# Patient Record
Sex: MALE | Race: BLACK OR AFRICAN AMERICAN
[De-identification: names, ages, dates, MRNs, and addresses within clinical notes are randomized per-mention and may not be internally consistent; named-entity substitution may affect disease eponyms.]

---

## 2021-07-11 NOTE — EKG
[address] [employer]  [address]
Phone:  [phone]                    ELECTROCARDIOGRAM REPORT      
_______________________________________________________________________________
 
Name:       GLORIA ESPINOZA           [facility]                     [id number]     Account #:      [id number]  
Admission:  21    Attend Phys:                          
Discharge:              Date of Birth:  [birth year]  
                                                          Report #: [id number]
   [id number]
_______________________________________________________________________________
                         [facility] ED
                                       
Test Date:    2021               Test Time:    08:20:24
Pat Name:     GLORIA ESPINOZA          Department:   
Patient ID:   [id number]            Room:          
Gender:                               Technician:   JEANNIE MIRAMONTES
:          1980               Requested By: Tavo Hager
Order Number: [id number] MD:   Ramana Lofton
                                 Measurements
Intervals                              Axis          
Rate:         89                       P:            62
AR:           176                      QRS:          19
QRSD:         127                      T:            41
QT:           361                                    
QTc:          440                                    
                           Interpretive Statements
Sinus rhythm
RBBB
ST elev, probable normal early repol pattern
No previous ECG available for comparison
Electronically Signed On 2021 10:57:25 CDT by Ramana Lofton
[email or web address]
 
 
 
 
 
 
 
 
 
 
 
 
 
 
 
 
 
 
 
 
 
  <ELECTRONICALLY SIGNED>
   By: Ramana Lofton MD               
  21     1057
D: 21 0820                           _____________________________________
T: 21 0820                           Ramana Lofton MD                 /EPI

## 2021-08-28 NOTE — NUR
PT ADMITTED TO THE UNIT AT 2200. PT IS A/O X4 AND IS UP AD JOAO. HAS BEEN
EDUCATED ON USE OF CALL LIGHT AND BED CONTROLS. IS ROOM AIR. VSS. AFEBRILE.
MEDICATIONS GIVEN PER MAR. WILL REMAIN ON CLEAR LIQ DIET. CALL LIGHT IS WITHIN
REACH. WILL CONTINUE TO MONITOR.

## 2021-08-28 NOTE — EKG
[place] [address] [facility]
[place]  [address]
Phone:  [phone]                    ELECTROCARDIOGRAM REPORT      
_______________________________________________________________________________
 
Name:       GLORIA ESPINOZA HOSSEIN           [id number]     Account #:      [id number]  
Admission:  21    Attend Phys:                          
Discharge:  21    Date of Birth:  [birth year]  
                                                          Report #: [id number]
   [id number]
_______________________________________________________________________________
                         [facility] ED
                                       
Test Date:    2021               Test Time:    13:45:37
Pat Name:     GLORIA ESPINOZA          Department:   
Patient ID:   [id number]            Room:          
Gender:                               Technician:   
:          1980               Requested By: Mayte Ayala
Order Number: [id number] MD:   Alex Milan
                                 Measurements
Intervals                              Axis          
Rate:         112                      P:            66
AL:           171                      QRS:          29
QRSD:         115                      T:            59
QT:           324                                    
QTc:          443                                    
                           Interpretive Statements
Sinus tachycardia
Right bundle branch block
Compared to ECG 2021 08:20:24
Heart rate has increased
Electronically Signed On 2021 9:35:17 CDT by Alex Milan
[email or web address]
 
 
 
 
 
 
 
 
 
 
 
 
 
 
 
 
 
 
 
 
 
  <ELECTRONICALLY SIGNED>
   By: Alex Milan MD, [facility]   
  21     0935
D: 21 1345                           _____________________________________
T: 21 1345                           Alex Milan MD, FACC     /EPI

## 2021-08-28 NOTE — NUR
pt states he hasnt been able to urinate much. was able to eventually give the
er staff some yesterday but may take awhile.

## 2021-08-29 NOTE — NUR
ASSUMED CARE OF PT AT 0700 THIS MORNING. PT IS A/OX4, C/O LOWER ABD PAIN THAT
IS CONSTANT & SHARP. PT HAS NO OTHER COMPLAINTS AT THIS TIME. ASSESSMENTS AS
CHARTED AND OTHERWISE UNREMARKABLE. IV IN RT AC WITH NS AT 75ML/HR. PT HAD BM
THIS MORNING LOOSE IN CONSISTENCY. PT IS UP AT JOAO, CALL LIGHT AND OTHER
NEEDS ARE WITHIN REACH. MEDS AND TX GIVEN AS NEEDED AND SCHEDULED. WILL
MONITOR AND NOTE ANY CHANGES.

## 2021-08-29 NOTE — NUR
ASSESSMENT COMPLETED. PT IS ALERT AND ORIENTED. CONTINUES TO COMPLAIN OF
ABDOMINAL PAIN-FENTANYL GIVEN AS WELL AS ZOFRAN. PT LAYING IN BED-DOES NOT
APPEAR TO BE IN ANY DISTRESS. REPORTS OVER 5 BMS EARLER IN THE DAY, NONE SO
FAR SINCE START OF SHIFT. AFEBRILE. AWAITING CDIFF RESULTS. IVF INFUSING AS
WELL AS SCHEDULED ABTS. REQUESTED SLEEP AIDE. CALLS WITH NEEDS.

## 2021-08-30 NOTE — NUR
ASUMED PT CARE UPON TRANSFER TO UNIT AT 1530. PATIENT IS A&OX4, ABLE TO MAKE
NEEDS KNOWN. PATIENT REPORTS ABDOMINAL PAIN THAT DECREASES WITH PAIN MEDS
GIVEN PER EMAR. SIGNIFICANT OTHER AT BEDSIDE.

## 2021-08-30 NOTE — NUR
ASSESSMENT: CM REVIEWED CHART AND SPOKE WITH PATIENT AT THE BEDSIDE. PT IS
ALERT AND ORIENTED X4. PT WAS ADMITTED DUE TO COLITIS. PT REPORTS THAT HE
LIVES IN A HOUSE WITH HIS FAMILY. PT REPORTS BEING FULLY INDEPENDENT WITH ADLS
AND AMBULATION. PT DENIES ANY HX OF HH OR SNF. PT IS CURRENTLY ON IV ANBX AND
GI IS FOLLOWING. PT IS HAVING STOOL STUDIES. PT REPORTS HE DOES NOT HAVE A
PCP. CM ENCOURAGED PATIENT TO ESTABILISH A PCP AND TO CONTACT THE NUMBER ON
THE BACK OF HIS INSURNACE CARD TO FIND A PROVIDER IN NETWORK. CM ALSO PROVIDED
PATIENT WITH A LIST OF PCP PHYSICIANS HERE AT [facility] ([facility]) TO
VERIFY IF THEY ARE IN NETWORK. CM WILL CONTINUE TO FOLLOW TO ASSIST AS NEEDED.

## 2021-08-30 NOTE — NUR
ASSUMED CARE OF PT AGAIN THIS MORNING. PT'S ASSESSMENTS AND REPORT ARE
UNCHANGED FROM YESTERDAY AND OTHERWISE UNREMARKABLE. PT IN INDEP AMBULATION.
IV IN RT FA WITH NS AT 75ML/HR. CALL LIGHT AND OTHER NEEDS ARE WITHIN REACH.
MEDS AND TX GIVEN AS NEEDED AND SCHEDULED. PT IS BEING XFERRED TO [facility] TO
MAKE ROOM FOR OR PATIENTS. REPORT GIVEN TO JEANNIE RICHARD.

## 2021-08-31 NOTE — NUR
ASSUMED PT CARE THIS AM. PT A&OX4, ABLE TO MAKE NEEDS KNOWN. PATIENT REPORTING
PAIN OF 6/10, RESPONDS WELL TO PAIN MEDICATION GIVEN PER EMAR. PATIENT REPORTS
HAVING CLEAR STOOLS OVERNIGHT. PATIENT NPO FOR COLONOSCOPY. IV REMAINS PATENT,
FLUIDS INFUSING WITHOUT ISSUE. PATIENT UP AROUND ROOM INDEPENDENTLY. PATIENT
REMAINS ON ROOM AIR. CALL LIGHT WITHIN REACH.

## 2021-08-31 NOTE — NUR
TODAY THIS PT HAS HAD SOME STATED PAIN IN HIS ABD IN WHICH IT WAS TAKEN CARE
OF WITH MEDICATIONS. HE COMPLAINED OF NOT BEING ABLE TO SMOKE SO PHYSICIAN WAS
CONTACTED AND ORDERS WERE GIVEN HE NOW HAS A NICOTINE PATCH ON HIS L ARM. HE
HAS BEEN NPO SINCE MIDNIGHT FOR HIS PROCEDURE LATER TODAY.

## 2021-08-31 NOTE — NUR
PT HAD COLONOSCOPY THIS DAY. ANTICIPATE THAT PT WILL BE ABLE TO DC HOME WITH
NO NEEDS ONCE MEDICALLY STABLE. CM FOLLOWING REGARDING DC PLANNING.

## 2021-09-01 NOTE — NUR
PATIENT AOX4 MAKES NEEDS KNOWN. PAIN CONTROLLED THIS SHIFT. PATIENT IS UP AT
JOAO. PATIENT IN BED ASLEEP AT THIS TIME BREATHING REGULAR AND UNLABOURED.

## 2021-09-01 NOTE — P
[facility]
[address]
[place]   [address]                     PROCEDURE REPORT              
_______________________________________________________________________________
 
Name:       GLORIA ESPINOZA JR          Room #:         464-P       ADM IN  
M.R.#:      [id number]                       Account #:      [id number]  
Admission:  08/28/21    Attend Phys:    Jimy Fry MD     
Discharge:              Date of Birth:  [birth year]  
                                                          Report #: [id number]
                                                                    [id number] 
_______________________________________________________________________________
THIS REPORT FOR:  
 
cc:  FAM - No family physician/PCP 
     FAM - No family physician/PCP                                        
     Alexis Bishop MD                                   ~
 
 
cc:     Jimy Fry MD
DATE OF SERVICE: 08/31/2021
 
PROCEDURE PERFORMED:  Colonoscopy with biopsies.
 
HISTORY OF PRESENT ILLNESS:  The patient was admitted on 08/28/2021 with 
abdominal pain, nausea, vomiting, diarrhea.  Symptoms began last Thursday.  He 
denied any melena or hematochezia.  A CT scan of the abdomen and pelvis 
concerning for pancolitis.  He was started on antibiotics.  He had continued 
symptoms.  Stool studies have all been negative including cryptosporidium, 
campylobacter, C. diff, Giardia, Salmonella, Shigella, all are negative.  The 
patient has been on ciprofloxacin, metronidazole since admission.  Plan is for 
colonoscopy.
 
DESCRIPTION OF PROCEDURE:  The risks and benefits of the procedure were 
explained to the patient, those risks including but not limited to bleeding, 
perforation and the risk of sedation.  He understood these risks and gave me 
informed consent.  Sedation was given using propofol per anesthesia.  Next, a 
digital rectal exam was initially performed, which was normal.  Next, using a 
standard Olympus colonoscope, the scope was placed in the patient's anus and 
advanced under direct vision to the cecum.  The overall prep was excellent.  
Moderate colitis was noted throughout most of the colon involving the cecum, 
ascending, transverse, descending and sigmoid colon.  Mild-to-no significant 
changes were noted in the rectum.  The terminal ileum was intubated and normal 
in appearance.  Multiple biopsies were obtained in the ascending and transverse 
colon. Rectal biopsies were also obtained.  There was no evidence of bleeding.  
On retroflexion, no abnormalities were noted.  The scope was then withdrawn and 
the procedure terminated.  The patient tolerated the procedure well.
 
IMPRESSION:
1.  Diffuse colitis.
2.  Terminal ileum was normal.
 
RECOMMENDATIONS:
1.  Await biopsy results.
2.  We will advance diet at this time. Start Levsin on a p.r.n. basis.  Continue
antibiotics until biopsy results have been returned.
 
 
 
[address]   [address]                     PROCEDURE REPORT              
_______________________________________________________________________________
 
Name:       GLORIA ESPINOZA           Room #:         464-P       [facility] [place]  
.R.#:      [id number]                       Account #:      [id number]  
Admission:  08/28/21    Attend Phys:    Jimy Fry MD     
Discharge:              Date of Birth:  [birth year]  
                                                          Report #: [id number]
                                                                    [id number] 
_______________________________________________________________________________
 
 
Thank you for allowing me to participate in his care.
 
 
 
 
 
 
 
 
 
 
 
 
 
 
 
 
 
 
 
 
 
 
 
 
 
 
 
 
 
 
 
 
 
 
 
 
 
 
 
 
 
  <ELECTRONICALLY SIGNED>
   By: Alexis Bishop MD   
  09/01/21     1436
D: 08/31/21 1258                           _____________________________________
T: 08/31/21 2357                           Alexis Bishop MD     /nt

## 2021-09-01 NOTE — NUR
patient left ams at around 1933 accompanied by wife. ama paper sighed.
Hiwot np and supervisor notified. patient aox4 makes needs known.patient
is up at alanis.

## 2021-09-01 NOTE — NUR
Alert and orientated X4.  Calm, cooperative and compliant.  States he is
having abdominal pain 9/10.  Fentanyl 50mcg given IV over 5 min with good
immediate relief.  States he could still feel cramps but they were painless.
Talking on phone most of AM.  At times irritable wanting coffee.  Breath
sounds clear.  Reg HR auscultated.  Color pink with brisk capillary refill and
palpable peripheral pulses.  No edema noted.  Independent with voiding.
Active bowel sounds over soft, rounded abdomen.  Currently in room, no s/o
distress.

## 2021-09-02 NOTE — PATH
[facility]
[address]
[place]   [address]                     PATHOLOGY RPT PROCEDURE       
_______________________________________________________________________________
 
Name:       BARRY ESPINOZA           Room #:         464-P       [facility] IN  
M.R.#:      [id number]     Account #:      [id number]  
Admission:  08/28/21    Date of Birth:  [birth year]  
Discharge:  09/01/21                                    Report #:    [id number]
                                                        Path Case #: [id number]
_______________________________________________________________________________
 
[facility] Accession Number: [id number]
.                                                                01
Material submitted:                                        .
PART A: colon - ASCENDING COLON COLITIS BIOPSY. Modifiers: ascending
PART B: colon - TRANSVERSE COLON COLITIS BIOPSY. Modifiers: transverse
PART C: rectum - RECTUM BIOPSY
.                                                                01
Clinical history:                                          .
COLONOSCOPY
COLITIS
.                                                                02
**********************************************************************
Diagnosis:
A. Large intestine, ascending colon colitis, endoscopic biopsy:
- Mild to moderate active colitis with cryptitis and crypt abscess
formation.
- Negative for dysplasia or malignancy.
.
B. Large intestine, transverse colon colitis, endoscopic biopsy:
- Mild to moderate active colitis with cryptitis, crypt abscess formation
as well as focal surface ulceration.
- Negative for dysplasia or malignancy.
.
C. Large intestine, rectum, endoscopic biopsy:
- Mild active colitis with cryptitis.
- Negative for dysplasia or malignancy.
[facility]  09/02/2021  [facility]3 Local
**********************************************************************
.                                                                02
Comment:
Examination shows mild to moderate active colitis within the "ascending
colon" and the "transverse colon" biopsy tissues.  Cryptitis, crypt
abscess formation and markedly expanded lamina propria is identified
within these biopsy tissues.  In addition, the transverse colon biopsy
tissue shows surface ulceration as well.  There are no granulomata, or
parasitic organisms present.  There are no fibrin thrombi within the
lamina propria vessels or ischemic changes identified.  The rectum biopsy
tissue shows cryptitis, and a lesser degree of inflammation.  Overall,
findings may be suggestive of early inflammatory bowel disease.
Architecturally abnormal crypts are not appreciated.  There is no basal
plasmacytosis as well.  The differential diagnosis includes Crohn's
disease as well as ulcerative colitis.  Lack of granulomata does not
entirely exclude the possibility of Crohn's disease. Additionally, active
episode of colitis due to infectious etiology, or drug/medication induced
colitis is in the differential as well.  Please correlate clinically.
([facility]; 9/02/2021)
.                                                                02
 
 
[address]   [address]                     PATHOLOGY RPT PROCEDURE       
_______________________________________________________________________________
 
Name:       BARRY ESPINOZA           Room #:         464-P       [facility]  
M.R.#:      [id number]     Account #:      [id number]  
Admission:  08/28/21    Date of Birth:  [birth year]  
Discharge:  09/01/21                                    Report #:    [id number]
                                                        Path Case #: [id number]
_______________________________________________________________________________
Electronically signed:                                     .
Teresa Chandler MD, Pathologist
NPI- [id number]
.                                                                01
Gross description:                                         .
A.  The specimen is submitted in formalin, labeled "Seda Jr., Barry,
ascending colon biopsy colitis". Received are 3 segments of pale tan
tissue ranging in size from 0.3 to 0.4 cm in maximum dimensions. The
specimen is submitted in cassette A1.
.
B.  The specimen is submitted in formalin, labeled "Seda Jr., Barry,
transverse colon colitis biopsy". Received are 2 segments of pale tan
tissue ranging in size from 0.4 to 0.5 cm in maximum dimensions. The
specimen is submitted in cassette B1.
.
C.  The specimen is submitted in formalin, labeled "Seda Jr., Barry,
rectum biopsy". Received are 5 segments of pale tan tissue ranging in size
from 0.2 to 0.6 cm in maximum dimensions. The specimen is submitted in
cassette C1.
([facility]; 9/1/2021)
[facility]  09/01/2021  1135 Local
.                                                                02
Pathologist provided ICD-10:
K52.9, K63.0, K62.89, K63.3
.                                                                02
CPT                                                        .
140449, 887847, [id number]
Specimen Comment: A courtesy copy of this report has been sent to [phone],
[phone]
Specimen Comment: [id number]
Specimen Comment: Report sent to  / DR MONTGOMERY
***Performed at:  01
   [facility], [place]  [id number]
   MD Urbano Christian MD Phone:  [phone]
***Performed at:  02
   [facility] [address]  [id number]
   MD Teresa Chandler MD Phone:  [phone]

## 2021-10-07 ENCOUNTER — HOSPITAL ENCOUNTER (OUTPATIENT)
Dept: HOSPITAL 35 - CAT | Age: 41
End: 2021-10-07
Attending: INTERNAL MEDICINE
Payer: COMMERCIAL

## 2021-10-07 DIAGNOSIS — R10.9: ICD-10-CM

## 2021-10-07 DIAGNOSIS — M25.78: ICD-10-CM

## 2021-10-07 DIAGNOSIS — M48.07: ICD-10-CM

## 2021-10-07 DIAGNOSIS — R19.7: ICD-10-CM

## 2021-10-07 DIAGNOSIS — K42.9: Primary | ICD-10-CM
